# Patient Record
Sex: FEMALE | URBAN - METROPOLITAN AREA
[De-identification: names, ages, dates, MRNs, and addresses within clinical notes are randomized per-mention and may not be internally consistent; named-entity substitution may affect disease eponyms.]

---

## 2023-03-23 ENCOUNTER — TESTING ONLY (OUTPATIENT)
Dept: URBAN - METROPOLITAN AREA CLINIC 33 | Facility: CLINIC | Age: 73
End: 2023-03-23
Payer: COMMERCIAL

## 2023-03-23 DIAGNOSIS — H25.13 AGE-RELATED NUCLEAR CATARACT, BILATERAL: Primary | ICD-10-CM

## 2023-03-23 ASSESSMENT — PACHYMETRY
OS: 25.02
OS: 2.74
OD: 24.90
OD: 2.84

## 2023-04-04 ENCOUNTER — OFFICE VISIT (OUTPATIENT)
Dept: URBAN - METROPOLITAN AREA CLINIC 33 | Facility: CLINIC | Age: 73
End: 2023-04-04
Payer: COMMERCIAL

## 2023-04-04 DIAGNOSIS — H53.022 REFRACTIVE AMBLYOPIA, LEFT EYE: ICD-10-CM

## 2023-04-04 DIAGNOSIS — H52.13 MYOPIA, BILATERAL: ICD-10-CM

## 2023-04-04 DIAGNOSIS — H52.223 REGULAR ASTIGMATISM, BILATERAL: ICD-10-CM

## 2023-04-04 DIAGNOSIS — H18.523 BILATERAL EPITHELIAL DYSTROPHY OF CORNEA: ICD-10-CM

## 2023-04-04 DIAGNOSIS — H25.13 AGE-RELATED NUCLEAR CATARACT, BILATERAL: Primary | ICD-10-CM

## 2023-04-04 PROCEDURE — 99204 OFFICE O/P NEW MOD 45 MIN: CPT | Performed by: OPHTHALMOLOGY

## 2023-04-04 RX ORDER — PREDNISOLONE ACETATE 10 MG/ML
1 % SUSPENSION/ DROPS OPHTHALMIC
Qty: 10 | Refills: 1 | Status: ACTIVE
Start: 2023-04-04

## 2023-04-04 ASSESSMENT — INTRAOCULAR PRESSURE
OS: 18
OD: 18

## 2023-04-04 NOTE — IMPRESSION/PLAN
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 Plan: Mild changes - will get radha for baseline. Will CTM Discussed with patient will limit the vision post cataract surgery.

## 2023-04-04 NOTE — IMPRESSION/PLAN
Impression: Refractive amblyopia, left eye: H53.022. Plan: Questions to amblyopia, per pt left eye has always been weaker growing up.   Discussed with patient will limit the vision post cataract surgery

## 2023-04-04 NOTE — IMPRESSION/PLAN
Impression: Age-related nuclear cataract, bilateral: H25.13. Plan: Patients cataract are visually significant and affecting patients daily activities. Okay to proceed with cataract surgery. Discussed risks, benefits and alternatives to surgery including but not limited to: bleeding, infection, risk of vision loss, loss of the eye, need for other surgery. Patient voiced understanding and wishes to proceed. LEFT EYE then RIGHT EYE
RL2, INJECTABLE OU (DEXTENZA FIRST CHOICE , TRIMOXI SECOND CHOICE) PLUS DROPS due to dense cataracts. LENS: Std 
AIM: -0.25 LAMONTE: declined ORA: declined Pt understands will need glasses for BCVA after Sx

**Pt need early surgery time due to transportation

## 2023-06-06 ENCOUNTER — SURGERY (OUTPATIENT)
Dept: URBAN - METROPOLITAN AREA SURGERY 15 | Facility: SURGERY | Age: 73
End: 2023-06-06
Payer: COMMERCIAL

## 2023-06-06 PROCEDURE — 66984 XCAPSL CTRC RMVL W/O ECP: CPT | Performed by: OPHTHALMOLOGY

## 2023-06-07 ENCOUNTER — POST-OPERATIVE VISIT (OUTPATIENT)
Dept: URBAN - METROPOLITAN AREA CLINIC 33 | Facility: CLINIC | Age: 73
End: 2023-06-07
Payer: COMMERCIAL

## 2023-06-07 DIAGNOSIS — Z48.810 ENCOUNTER FOR SURGICAL AFTERCARE FOLLOWING SURGERY ON A SENSE ORGAN: Primary | ICD-10-CM

## 2023-06-07 PROCEDURE — 99024 POSTOP FOLLOW-UP VISIT: CPT

## 2023-06-07 RX ORDER — BRIMONIDINE TARTRATE 2 MG/ML
0.2 % SOLUTION/ DROPS OPHTHALMIC
Qty: 15 | Refills: 0 | Status: ACTIVE
Start: 2023-06-07

## 2023-06-07 ASSESSMENT — INTRAOCULAR PRESSURE: OS: 30

## 2023-06-07 NOTE — IMPRESSION/PLAN
Impression: S/P Cataract Extraction by phacoemulsification with IOL placement; DEXTENZA OS - 1 Day. Encounter for surgical aftercare following surgery on a sense organ  Z48.810. Plan: Eye healing appropriately. All questions answered. Return to clinic if patient notes any pain or decrease in vision. Start brimonidine TID OS.

## 2023-06-14 ENCOUNTER — POST-OPERATIVE VISIT (OUTPATIENT)
Dept: URBAN - METROPOLITAN AREA CLINIC 33 | Facility: CLINIC | Age: 73
End: 2023-06-14
Payer: COMMERCIAL

## 2023-06-14 DIAGNOSIS — Z48.810 ENCOUNTER FOR SURGICAL AFTERCARE FOLLOWING SURGERY ON A SENSE ORGAN: Primary | ICD-10-CM

## 2023-06-14 PROCEDURE — 99024 POSTOP FOLLOW-UP VISIT: CPT | Performed by: OPTOMETRIST

## 2023-06-14 ASSESSMENT — INTRAOCULAR PRESSURE
OD: 17
OS: 17

## 2023-06-14 ASSESSMENT — VISUAL ACUITY: OS: 20/60

## 2023-06-14 NOTE — IMPRESSION/PLAN
Impression: S/P Cataract Extraction by phacoemulsification with IOL placement; DEXTENZA OS - 8 Days. Encounter for surgical aftercare following surgery on a sense organ  Z48.810. 

Decreased BCVA OS due to Refractive AMblyopia OS, Severe EMBD OS Plan: Keep CE OD

## 2023-06-20 ENCOUNTER — SURGERY (OUTPATIENT)
Dept: URBAN - METROPOLITAN AREA SURGERY 15 | Facility: SURGERY | Age: 73
End: 2023-06-20
Payer: COMMERCIAL

## 2023-06-20 PROCEDURE — 66984 XCAPSL CTRC RMVL W/O ECP: CPT | Performed by: OPHTHALMOLOGY

## 2023-06-21 ENCOUNTER — POST-OPERATIVE VISIT (OUTPATIENT)
Dept: URBAN - METROPOLITAN AREA CLINIC 33 | Facility: CLINIC | Age: 73
End: 2023-06-21
Payer: COMMERCIAL

## 2023-06-21 DIAGNOSIS — Z96.1 PRESENCE OF INTRAOCULAR LENS: Primary | ICD-10-CM

## 2023-06-21 PROCEDURE — 99024 POSTOP FOLLOW-UP VISIT: CPT

## 2023-06-21 ASSESSMENT — INTRAOCULAR PRESSURE
OD: 21
OS: 21

## 2023-06-28 ENCOUNTER — POST-OPERATIVE VISIT (OUTPATIENT)
Dept: URBAN - METROPOLITAN AREA CLINIC 33 | Facility: CLINIC | Age: 73
End: 2023-06-28
Payer: COMMERCIAL

## 2023-06-28 DIAGNOSIS — Z96.1 PRESENCE OF INTRAOCULAR LENS: Primary | ICD-10-CM

## 2023-06-28 PROCEDURE — 99024 POSTOP FOLLOW-UP VISIT: CPT | Performed by: OPTOMETRIST

## 2023-06-28 ASSESSMENT — VISUAL ACUITY: OD: 20/30

## 2023-06-28 ASSESSMENT — INTRAOCULAR PRESSURE
OD: 17
OS: 17

## 2023-06-28 NOTE — IMPRESSION/PLAN
Impression: S/P Cataract Extraction by phacoemulsification with IOL placement OD - 8 Days. Presence of intraocular lens  Z96.1. Plan: Condition improving. Return in 3-4 weeks for PO3 +MRX. D/C Prednisolone D/C Brimonidine

## 2023-07-20 ENCOUNTER — OFFICE VISIT (OUTPATIENT)
Dept: URBAN - METROPOLITAN AREA CLINIC 33 | Facility: CLINIC | Age: 73
End: 2023-07-20
Payer: COMMERCIAL

## 2023-07-20 DIAGNOSIS — H52.4 PRESBYOPIA: ICD-10-CM

## 2023-07-20 DIAGNOSIS — Z96.1 PRESENCE OF INTRAOCULAR LENS: ICD-10-CM

## 2023-07-20 DIAGNOSIS — Z48.810 ENCOUNTER FOR SURGICAL AFTERCARE FOLLOWING SURGERY ON A SENSE ORGAN: Primary | ICD-10-CM

## 2023-07-20 PROCEDURE — 99024 POSTOP FOLLOW-UP VISIT: CPT

## 2023-07-20 PROCEDURE — 92015 DETERMINE REFRACTIVE STATE: CPT

## 2023-07-20 ASSESSMENT — VISUAL ACUITY
OD: 20/30
OS: 20/30

## 2023-07-20 ASSESSMENT — INTRAOCULAR PRESSURE
OD: 18
OS: 18

## 2023-07-20 NOTE — IMPRESSION/PLAN
Impression: Encounter for surgical aftercare following surgery on a sense organ: Z48.810 OS. Plan: Pt is doing well after surgery. Updated SRx. RTC 1 year.

## 2023-07-20 NOTE — IMPRESSION/PLAN
Impression: Presence of intraocular lens: Z96.1 OD. Plan: Pt educated. S/p CE/IOL, lens well positioned and clear. Monitor.